# Patient Record
Sex: MALE | Race: WHITE
[De-identification: names, ages, dates, MRNs, and addresses within clinical notes are randomized per-mention and may not be internally consistent; named-entity substitution may affect disease eponyms.]

---

## 2019-06-23 ENCOUNTER — HOSPITAL ENCOUNTER (EMERGENCY)
Dept: HOSPITAL 41 - JD.ED | Age: 39
Discharge: HOME | End: 2019-06-23
Payer: COMMERCIAL
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
## 2019-06-23 DIAGNOSIS — S80.812A: Primary | ICD-10-CM

## 2019-06-23 DIAGNOSIS — E66.9: ICD-10-CM
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
## 2019-06-23 DIAGNOSIS — S80.811A: ICD-10-CM
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
## 2019-06-23 PROCEDURE — 99283 EMERGENCY DEPT VISIT LOW MDM: CPT

## 2019-06-23 NOTE — EDM.PDOC
ED HPI GENERAL MEDICAL PROBLEM





- General


Chief Complaint: Lower Extremity Injury/Pain


Stated Complaint: KNEE INFECTION


Time Seen by Provider: 06/23/19 22:05


Source of Information: Reports: Patient, RN Notes Reviewed


History Limitations: Reports: No Limitations





- History of Present Illness


INITIAL COMMENTS - FREE TEXT/NARRATIVE: 





The patient states that he jumped off a diving board into a swimming pool on 

Tuesday, 6/18/2019. He states that because he is heavyset, the diving board 

bent down more than usual. He slipped, and the board sprang up, scraping his 

bilateral anterior legs. He states that he doused the wounds with hydrogen 

peroxide on 6/18/2019 and 6/19/2019, and since then he has been treating the 

abrasions with Neosporin and a clean dressing. He states that he has since, 

however, developed erythema surrounding the abrasions and swelling to both of 

his legs. No recent fever.





The patient also asked for me to take a look at a lump on the back of his neck 

that has been there for years.





The patient does not use antibacterial soap at home (which would increase his 

risk of being colonized with MRSA).





The patient does not have a PCP.








Treatments PTA: Reports: Other (see below)


Other Treatments PTA: tylenol 1000 mg this morning


  ** Bilateral Lower Leg


Pain Score (Numeric/FACES): 4





- Related Data


 Allergies











Allergy/AdvReac Type Severity Reaction Status Date / Time


 


No Known Allergies Allergy   Verified 06/23/19 21:08











Home Meds: 


 Home Meds





. [No Known Home Meds]  06/23/19 [History]











Past Medical History


Musculoskeletal History: Reports: Fracture (left wrist)


Endocrine/Metabolic History: Reports: Obesity/BMI 30+





- Past Surgical History


Musculoskeletal Surgical History: Reports: ORIF (left wrist)





Social & Family History





- Tobacco Use


Smoking Status *Q: Never Smoker





- Caffeine Use


Caffeine Use: Reports: Coffee, Soda





- Alcohol Use


Alcohol Use History: Yes


Alcohol Use Frequency: Socially





- Recreational Drug Use


Recreational Drug Use: Yes


Drug Use in Last 12 Months: No


Recreational Drug Type: Reports: Marijuana/Hashish (last smoked around 2003)





- Living Situation & Occupation


Living situation: Reports: Single, with Family (Son)


Occupation: Employed ()





Review of Systems





- Review of Systems


Review Of Systems: ROS reveals no pertinent complaints other than HPI.





ED EXAM, GENERAL





- Physical Exam


Exam: See Below


Exam Limited By: No Limitations


General Appearance: Alert, WD/WN, No Apparent Distress


Neck: Other (Soft, nontender lump to the inferior aspect of the patient's 

posterior neck, consistent with a lipoma)


Extremities: Other (Scabbed abrasions to the anterior bilateral legs, the left 

larger than the right, with surrounding erythema. No significant increased 

warmth to the erythema. Minimal lower extremity edema. Neurovascular status of 

both lower extremities is intact.)





Course





- Vital Signs


Last Recorded V/S: 


 Last Vital Signs











Temp  37.2 C   06/23/19 21:12


 


Pulse  79   06/23/19 21:12


 


Resp  20   06/23/19 21:12


 


BP  150/94 H  06/23/19 21:12


 


Pulse Ox  97   06/23/19 21:12














- Orders/Labs/Meds


Meds: 


Medications














Discontinued Medications














Generic Name Dose Route Start Last Admin





  Trade Name Freq  PRN Reason Stop Dose Admin


 


Cephalexin  500 mg  06/23/19 22:18  06/23/19 22:29





  Keflex  PO  06/23/19 22:19  500 mg





  ONETIME ONE   Administration





     





     





     





     














- Re-Assessments/Exams


Free Text/Narrative Re-Assessment/Exam: 





06/23/19 22:18


It is not clear that the patient has an infection to either of the abrasions to 

his anterior legs. The abrasions themselves have scabbed over, and while there 

is surrounding erythema, that could be due to the Neosporin that he has been 

applying. Nevertheless, I think it is reasonable that he be treated with an 

antibiotic for a few days, just in case there is an infection. I will start him 

on cephalexin, and prescribe additional via [facility]. I advised that the 

patient stop using Neosporin. He is to keep the wounds clean with ordinary soap 

and water. Since the wounds have scabbed over, a dressing is not needed unless 

he has pain when his pants brush on the wounds.





With respect to the lump on the back of the patient's neck, it appears that the 

patient has a lipoma. I advised him to leave it alone.





Departure





- Departure


Time of Disposition: 22:20


Disposition: Home, Self-Care 01


Condition: Good


Clinical Impression: 


 Abrasion of anterior left lower leg, Abrasion of anterior right lower leg








- Discharge Information


*PRESCRIPTION DRUG MONITORING PROGRAM REVIEWED*: Not Applicable


*COPY OF PRESCRIPTION DRUG MONITORING REPORT IN PATIENT EMILIA: Not Applicable


Instructions:  Abrasion, Easy-to-Read


Referrals: 


PCP,None [Primary Care Provider] - 


Forms:  ED Department Discharge


Additional Instructions: 


You were seen in the emergency room for evaluation of abrasions on the front of 

both of your legs, along with a lump to the back of your neck.





It is unclear if either of your legs are infected. The redness may be due to 

the Neosporin that you have been applying. Nevertheless, you have been started 

on antibiotic Keflex, and a prescription for a five-day course has been 

provided.





Take one tablet of Keflex every 6 hours, starting around 4:30 tomorrow morning, 

Monday, 6/24/2019, as prescribed.





Keep the wounds clean with ordinary soap and water. Pat dry, then leave alone. 

A dressing is not necessary unless your pants cause pain when brushing against 

the wounds.





We recommend that you not apply an antibiotic ointment.





The lump on your back appears to be a lipoma = a benign fatty tumor. These are 

not dangerous, and nothing needs to be done about it.





If any other problems, please do not hesitate to return to the ER.